# Patient Record
Sex: FEMALE | URBAN - METROPOLITAN AREA
[De-identification: names, ages, dates, MRNs, and addresses within clinical notes are randomized per-mention and may not be internally consistent; named-entity substitution may affect disease eponyms.]

---

## 2024-09-13 ENCOUNTER — APPOINTMENT (RX ONLY)
Dept: URBAN - METROPOLITAN AREA CLINIC 69 | Facility: CLINIC | Age: 59
Setting detail: DERMATOLOGY
End: 2024-09-13

## 2024-09-13 DIAGNOSIS — L40.0 PSORIASIS VULGARIS: ICD-10-CM

## 2024-09-13 PROCEDURE — ? PRESCRIPTION

## 2024-09-13 PROCEDURE — ? SKYRIZI INITIATION

## 2024-09-13 PROCEDURE — ? PRESCRIPTION MEDICATION MANAGEMENT

## 2024-09-13 PROCEDURE — ? COUNSELING

## 2024-09-13 PROCEDURE — 99204 OFFICE O/P NEW MOD 45 MIN: CPT

## 2024-09-13 PROCEDURE — ? ORDER TESTS

## 2024-09-13 RX ORDER — CLOBETASOL PROPIONATE 0.5 MG/ML
SOLUTION TOPICAL BID
Qty: 50 | Refills: 3 | Status: ERX | COMMUNITY
Start: 2024-09-13

## 2024-09-13 RX ORDER — TRIAMCINOLONE ACETONIDE 1 MG/G
CREAM TOPICAL BID
Qty: 454 | Refills: 3 | Status: ERX | COMMUNITY
Start: 2024-09-13

## 2024-09-13 RX ADMIN — CLOBETASOL PROPIONATE: 0.5 SOLUTION TOPICAL at 00:00

## 2024-09-13 RX ADMIN — TRIAMCINOLONE ACETONIDE: 1 CREAM TOPICAL at 00:00

## 2024-09-13 ASSESSMENT — LOCATION ZONE DERM
LOCATION ZONE: ARM
LOCATION ZONE: TRUNK
LOCATION ZONE: SCALP
LOCATION ZONE: LEG
LOCATION ZONE: EAR
LOCATION ZONE: HAND

## 2024-09-13 ASSESSMENT — LOCATION DETAILED DESCRIPTION DERM
LOCATION DETAILED: LEFT PROXIMAL POSTERIOR THIGH
LOCATION DETAILED: LEFT ULNAR DORSAL HAND
LOCATION DETAILED: RIGHT VENTRAL PROXIMAL FOREARM
LOCATION DETAILED: EPIGASTRIC SKIN
LOCATION DETAILED: LEFT SUPERIOR HELIX
LOCATION DETAILED: RIGHT MEDIAL FRONTAL SCALP
LOCATION DETAILED: RIGHT ANTERIOR PROXIMAL THIGH
LOCATION DETAILED: LEFT VENTRAL PROXIMAL FOREARM
LOCATION DETAILED: RIGHT RADIAL DORSAL HAND
LOCATION DETAILED: LEFT ANTERIOR PROXIMAL THIGH
LOCATION DETAILED: RIGHT SUPERIOR HELIX
LOCATION DETAILED: LEFT PROXIMAL DORSAL FOREARM
LOCATION DETAILED: RIGHT PROXIMAL DORSAL FOREARM
LOCATION DETAILED: RIGHT INFERIOR MEDIAL UPPER BACK
LOCATION DETAILED: RIGHT PROXIMAL POSTERIOR THIGH

## 2024-09-13 ASSESSMENT — LOCATION SIMPLE DESCRIPTION DERM
LOCATION SIMPLE: RIGHT HAND
LOCATION SIMPLE: RIGHT SCALP
LOCATION SIMPLE: LEFT POSTERIOR THIGH
LOCATION SIMPLE: LEFT FOREARM
LOCATION SIMPLE: LEFT EAR
LOCATION SIMPLE: LEFT HAND
LOCATION SIMPLE: ABDOMEN
LOCATION SIMPLE: RIGHT FOREARM
LOCATION SIMPLE: LEFT THIGH
LOCATION SIMPLE: RIGHT POSTERIOR THIGH
LOCATION SIMPLE: RIGHT EAR
LOCATION SIMPLE: RIGHT UPPER BACK
LOCATION SIMPLE: RIGHT THIGH

## 2024-09-13 ASSESSMENT — BSA PSORIASIS: % BODY COVERED IN PSORIASIS: 50

## 2024-09-13 NOTE — PROCEDURE: SKYRIZI INITIATION
Skyrizi Monitoring Guidelines: A yearly test for tuberculosis is required while taking Skyrizi.
Is Phototherapy Contraindicated?: No
Skyrizi Dosing: 150 mg SC week 0 and week 4 then every 12 weeks thereafter
Include Weight Question: Yes - Pounds
Diagnosis (Required): Psoriasis
Pregnancy And Lactation Warning Text: The risk during pregnancy and breastfeeding is uncertain with this medication.
Detail Level: Zone

## 2024-09-13 NOTE — PROCEDURE: PRESCRIPTION MEDICATION MANAGEMENT
Initiate Treatment: -\\n- Skyrizi: 150 mg SC week 0 and week 4 then every 12 weeks thereafter\\n- clobetasol 0.05 % scalp solution BID to AA on scalp for itchy psoriasis plaques BID for up to 2 weeks. Repeat as needed\\n- triamcinolone acetonide 0.1 % topical cream BID to AA of psoriasis on body twice a day for 2 weeks. Avoid face, groin and axilla. PRN flares
Render In Strict Bullet Format?: No
Detail Level: Zone

## 2024-09-13 NOTE — PROCEDURE: ORDER TESTS
Expected Date Of Service: 09/13/2024
Billing Type: Third-Party Bill
Performing Laboratory: [id number]
Bill For Surgical Tray: no

## 2024-09-27 ENCOUNTER — APPOINTMENT (RX ONLY)
Dept: URBAN - METROPOLITAN AREA CLINIC 69 | Facility: CLINIC | Age: 59
Setting detail: DERMATOLOGY
End: 2024-09-27

## 2024-09-27 DIAGNOSIS — L40.0 PSORIASIS VULGARIS: ICD-10-CM

## 2024-09-27 PROCEDURE — ? SKYRIZI INJECTION

## 2024-09-27 PROCEDURE — 96372 THER/PROPH/DIAG INJ SC/IM: CPT

## 2024-09-27 ASSESSMENT — LOCATION SIMPLE DESCRIPTION DERM: LOCATION SIMPLE: RIGHT THIGH

## 2024-09-27 ASSESSMENT — LOCATION ZONE DERM: LOCATION ZONE: LEG

## 2024-09-27 ASSESSMENT — LOCATION DETAILED DESCRIPTION DERM: LOCATION DETAILED: RIGHT ANTERIOR PROXIMAL THIGH

## 2024-09-27 NOTE — PROCEDURE: SKYRIZI INJECTION
Skyrizi Amount: 150 mg
Detail Level: None
Syringe Size Used (Required For Enhanced Ndc): 150 mg/ml Auto-Injector
Date Of Next Injection: Other Time Frame (Enter Below)
Other Time Frame Value: 4 weeks
Lot # (Optional): [id number]
Expiration Date (Optional): 10/2025
Administered By (Optional): Major Fernandez MA
Consent: The risks of pain and injection site reactions were reviewed with the patient prior to the injection.
Include J-Code In Bill: No
J-Code: 
Use Enhanced Ndc?: Yes
Ndc (75mg/0.83ml Syringe): [address]-24
Ndc (150mg/Ml Syringe): 7994-6518-31
[id number] Billing Preferences: By Number of Body Touches

## 2024-09-30 ENCOUNTER — RX ONLY (OUTPATIENT)
Age: 59
Setting detail: RX ONLY
End: 2024-09-30

## 2024-09-30 RX ORDER — RISANKIZUMAB-RZAA 150 MG/ML
INJECTION SUBCUTANEOUS
Qty: 1 | Refills: 0 | Status: ERX | COMMUNITY
Start: 2024-09-30

## 2024-09-30 RX ORDER — RISANKIZUMAB-RZAA 150 MG/ML
INJECTION SUBCUTANEOUS
Qty: 1 | Refills: 0 | Status: ERX

## 2024-11-01 ENCOUNTER — APPOINTMENT (RX ONLY)
Dept: URBAN - METROPOLITAN AREA CLINIC 69 | Facility: CLINIC | Age: 59
Setting detail: DERMATOLOGY
End: 2024-11-01

## 2024-11-01 DIAGNOSIS — Z41.9 ENCOUNTER FOR PROCEDURE FOR PURPOSES OTHER THAN REMEDYING HEALTH STATE, UNSPECIFIED: ICD-10-CM

## 2024-11-01 DIAGNOSIS — L40.0 PSORIASIS VULGARIS: ICD-10-CM

## 2024-11-01 PROCEDURE — ? SKYRIZI INJECTION

## 2024-11-01 PROCEDURE — 96372 THER/PROPH/DIAG INJ SC/IM: CPT

## 2024-11-01 PROCEDURE — ? BOTOX (U OR CC)

## 2024-11-01 ASSESSMENT — LOCATION SIMPLE DESCRIPTION DERM
LOCATION SIMPLE: LEFT EYELID
LOCATION SIMPLE: RIGHT THIGH
LOCATION SIMPLE: RIGHT EYELID

## 2024-11-01 ASSESSMENT — LOCATION ZONE DERM
LOCATION ZONE: LEG
LOCATION ZONE: EYELID

## 2024-11-01 ASSESSMENT — LOCATION DETAILED DESCRIPTION DERM
LOCATION DETAILED: LEFT LATERAL CANTHUS
LOCATION DETAILED: RIGHT LATERAL CANTHUS
LOCATION DETAILED: RIGHT ANTERIOR PROXIMAL THIGH

## 2024-11-01 NOTE — PROCEDURE: SKYRIZI INJECTION
Skyrizi Amount: 150 mg
Treatment Number (Optional): 2
Detail Level: None
Syringe Size Used (Required For Enhanced Ndc): 150 mg/ml Auto-Injector
Date Of Next Injection: Other Time Frame (Enter Below)
Other Time Frame Value: 12 weeks
Lot # (Optional): [id number]
Expiration Date (Optional): 10/2025
Administered By (Optional): Tia Tavarez MA
Additional Comments: Sample of Skyrizi was used for 2nd loading dose. Appeal of denial is still in process.
Consent: The risks of pain and injection site reactions were reviewed with the patient prior to the injection.
Include J-Code In Bill: No
J-Code: 
Use Enhanced Ndc?: Yes
Ndc (75mg/0.83ml Syringe): [address]-75
Ndc (150mg/Ml Syringe): 9244-3111-58
[id number] Billing Preferences: By Number of Body Touches

## 2024-11-01 NOTE — PROCEDURE: BOTOX (U OR CC)
Periorbital Skin Units/Cc: 12
Consent: Written consent obtained. Risks include but not limited to lid/brow ptosis, bruising, swelling, diplopia, temporary effect, incomplete chemical denervation.
Expiration Date (Month Year): 10/26
Dilution (U/0.1 Cc): 4
Additional Area 8 Units: 0
Detail Level: Detailed
Including Pricing Information In The Note: No
Lot #: [id number]
Price (Use Numbers Only, No Special Characters Or $): 192.00
Document As Units Or Cc?: units
Post-Care Instructions: Patient instructed to not lie down for 4 hours and limit physical activity for 24 hours. Patient instructed not to travel by airplane for 48 hours.
Quantity Per Injection Site (Units Or Cc): 3 U